# Patient Record
Sex: FEMALE | Race: WHITE | NOT HISPANIC OR LATINO | Employment: OTHER | URBAN - METROPOLITAN AREA
[De-identification: names, ages, dates, MRNs, and addresses within clinical notes are randomized per-mention and may not be internally consistent; named-entity substitution may affect disease eponyms.]

---

## 2017-03-17 ENCOUNTER — OFFICE VISIT (OUTPATIENT)
Dept: SURGERY | Facility: CLINIC | Age: 32
End: 2017-03-17

## 2017-03-17 VITALS — HEIGHT: 66 IN | HEART RATE: 82 BPM | BODY MASS INDEX: 39.05 KG/M2 | WEIGHT: 243 LBS | OXYGEN SATURATION: 98 %

## 2017-03-17 DIAGNOSIS — L72.11 PILAR CYST: Primary | ICD-10-CM

## 2017-03-17 PROCEDURE — 99203 OFFICE O/P NEW LOW 30 MIN: CPT | Performed by: SURGERY

## 2017-03-17 RX ORDER — NORETHINDRONE-ETHIN. ESTRADIOL 7 DAYS X 3
1 TABLET ORAL DAILY
COMMUNITY
Start: 2017-03-03

## 2017-03-22 NOTE — PROGRESS NOTES
General Surgery  Initial Office Visit    CC: Pilar cyst    HPI: The patient is a pleasant 32 y.o. 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 They have been present for over one year and have slowly enlarged.  She has 4 in particular that she can palpate and she wishes to have excised electively.  She has a previous history of a pilar cyst in 2013 that was excised without issue.  The 4 separate cysts are primarily located over the right parietal scalp, with the largest one just behind her right ear measuring 1.5 cm in diameter.  The others are all between 3 and 5 mm in diameter.  None of these have ever been erythematous, tender, or had any drainage except for the one just behind her right ear that gets rubbed frequently when she wears had bands and his becoming more and more uncomfortable.    Past Medical History: Asthma, arthritis, depression    Past Surgical History: Ovarian surgery in 2008, previous pilar cyst removal in 2013    Medications: Cyclafem 7/7/7 once daily    Allergies: No known drug allergies    Family History: Grandma has a history of lung cancer, mother and sister both have history of pilar cysts of the scalp    Social History: Single, nonsmoker, drinks 2 alcoholic beverages per week, works as a missionary for [employer]    ROS:   Constitutional: Negative for fevers or chills  HENT: Positive for congestion, mouth sores, runny nose, sinus pressure, sneezing, and sore throat; Negative for hearing loss  Eyes: Negative for vision changes or scleral icterus  Respiratory: Positive for wheezing; Negative for cough or shortness of breath  Cardiovascular: Negative for chest pain or heart palpitations  Gastrointestinal: Negative for abdominal pain, nausea, vomiting, constipation, melena, or hematochezia  Genitourinary: Negative for hematuria or dysuria  Musculoskeletal: Positive for joint pain; Negative for muscle pain or back pain  Skin: Positive for rash;  Negative for non-healing wounds  Neurologic: Negative for headaches or dizziness  Psychiatric: Negative for anxiety or depression  All other systems reviewed and negative    Physical Exam:  Vitals:    03/17/17 1041   Pulse: 82   SpO2: 98%     General: No acute distress, well-nourished & well-developed  HEAD: normocephalic, atraumatic, 1.5 cm right posterior auricular pilar cyst, 3 mm right parietal pilar cyst, 3 mm right parietal pilar cyst just anterior to previously mentioned cyst, 5 mm left parietal pilar cyst; none with erythema, fluctuance, or drainage  EYES: normal conjunctiva, sclera anicteric  EARS: grossly normal hearing  NECK: supple, no thyromegaly  CARDIOVASCULAR: regular rate and rhythm  RESPIRATORY: clear to auscultation bilaterally  GASTROINTESTINAL: soft, nontender, non-distended  MUSCULOSKELETAL: normal gait and station. No gross extremity abnormalities  PSYCHIATRIC: oriented x3, normal mood and affect    ASSESSMENT & PLAN  I discussed with her the option of having all of these cysts electively excised from her scalp under local/MAC.  I discussed that I would need to shave a small rim of hair from around each of the cysts and she immediately changed her mind about having this performed as she does not want to have any of her hair shaved off.  I counseled her that there is no way to remove these pilar cysts and close the incision properly with the hair being in the way.  Additionally, given the heavy vascularity of the scalp,I have no plans on doing this in the office setting given her risk for bleeding.  I would only agree to do this in the operating room with light sedation to achieve better hemostasis.  Again, she wishes to avoid having to have any hair removed and therefore she will call me in the future if she changes her mind about undergoing elective cyst excision.  I gave her my card, and told her that she can follow-up with me as needed.    Inessa Stone MD  General and Endoscopic  Surgery  [facility]    [address], [address]  [place], [place], [address]  P: [phone]  F: [phone]

## 2017-07-26 ENCOUNTER — OFFICE VISIT (OUTPATIENT)
Dept: SURGERY | Facility: CLINIC | Age: 32
End: 2017-07-26

## 2017-07-26 VITALS
BODY MASS INDEX: 38.41 KG/M2 | WEIGHT: 239 LBS | RESPIRATION RATE: 20 BRPM | OXYGEN SATURATION: 98 % | SYSTOLIC BLOOD PRESSURE: 130 MMHG | HEIGHT: 66 IN | HEART RATE: 85 BPM | DIASTOLIC BLOOD PRESSURE: 78 MMHG

## 2017-07-26 DIAGNOSIS — R10.31 RIGHT LOWER QUADRANT ABDOMINAL PAIN: Primary | ICD-10-CM

## 2017-07-26 PROCEDURE — 99244 OFF/OP CNSLTJ NEW/EST MOD 40: CPT | Performed by: SURGERY

## 2017-07-26 NOTE — PROGRESS NOTES
Chief Complaint   Patient presents with   • Abdominal Pain        Patient is a 32 y.o. female referred by Suresh Cisse MD for evaluation of abdominal pain.  Patient reports that this pain started in January on her right side low pelvic area and was worse during her menses.  Patient had had a left oophorectomy for a large cyst in the past and thought maybe it was gynecological pain.  Patient describes the pain as a gnawing stabbing intermittent sometimes sharp pain.  Patient thinks eating makes it feel better.  Pain is worse in the morning and with her menses.  Having a bowel movement does not alleviate the pain.  However she is seeing a lot of mucus in her stool now that wasn't there before.  Patient had a vaginal ultrasound and was evaluated by her OB/GYN and was told that this was not pain from her gynecological area.  Patient reports that her mother had colon polyps.  Patient denies ever having any ulcers.  Patient denies any family history of ulcerative colitis, Crohn's disease, familial polyposis or colon cancer.  Patient denies melena, hematochezia or bright red blood per abdomen.  Patient denies fever, chills, nausea or vomiting.    Past Medical History:   Diagnosis Date   • Arthritis    • Asthma    • Depression      Past Surgical History:   Procedure Laterality Date   • OVARY SURGERY      left side taken out     Family History   Problem Relation Age of Onset   • Lung cancer Maternal Grandmother    • Colon polyps Mother      Social History   Substance Use Topics   • Smoking status: Never Smoker   • Smokeless tobacco: Never Used   • Alcohol use 1.2 oz/week     2 Standard drinks or equivalent per week      Comment: WEEKLY         Current Outpatient Prescriptions:   •  CYCLAFEM 7/7/7 0.5/0.75/1-35 MG-MCG per tablet, Take 1 tablet by mouth Daily., Disp: , Rfl:   •  ibuprofen (ADVIL,MOTRIN) 100 MG/5ML suspension, Take  by mouth Every 6 (Six) Hours As Needed for Mild Pain (1-3)., Disp: , Rfl:   •  Probiotic  "Product (SOLUBLE FIBER/PROBIOTICS PO), Take  by mouth., Disp: , Rfl:     Review of Systems   Constitutional: Positive for unexpected weight change.   Respiratory: Positive for cough and shortness of breath.    Gastrointestinal: Positive for abdominal pain.        Bowel habits change      Musculoskeletal: Positive for joint swelling.   Skin:        Itching      All other systems reviewed and are negative.    Vitals:    07/26/17 1202   BP: 130/78   BP Location: Left arm   Patient Position: Sitting   Cuff Size: Adult   Pulse: 85   Resp: 20   SpO2: 98%   Weight: 239 lb (108 kg)   Height: 66\" (167.6 cm)       Physical Exam  General/physcological:   Alert and oriented x3 in no acute distress  HEENT: Normal cephalic, atraumatic, PERRLA, EOMI, sclera anicteric, moist mucous membranes, neck is supple, no JVD, no carotid bruits, no thyromegaly no adenopathy  Respiratory: CTA and percussion  CVA: RRR, normal S1-S2, no murmurs, no gallops or rubs  GI: Positive BS, soft, nondistended, nontender, no rebound, no guarding, no hernias, no organomegaly and no masses  Musculoskeletal: Full range of motion, no clubbing, no cyanosis or edema  Neurovascular: Grossly intact    Patient does not use tobacco products currently and I have counseled the patient to not start using tobacco products in the future.    Assessment:  Right sided abdominal pain  Plan:  I have recommended that the patient undergo further workup with an EGD and colonoscopy for her abdominal pain and change in her bowel movements.  I have discussed both of these procedures in detail with the patient.  I have discussed the risks, benefits and alternatives.  I have discussed the risk of anesthesia, bleeding and perforation.  Patient understands these risks, benefits and alternatives and wishes to proceed.  I have her scheduled at her earliest convenience.    Erika Owens MD  General, Minimally Invasive and Endoscopic Surgery  [facility]    [address], [address]  [place], [place], [address]  P: [phone]  F: [phone]    Cc:  Suresh Cisse MD

## 2017-07-27 NOTE — H&P
Chief Complaint   Patient presents with   • Abdominal Pain        Patient is a 32 y.o. female referred by Suresh Cisse MD for evaluation of abdominal pain.  Patient reports that this pain started in January on her right side low pelvic area and was worse during her menses.  Patient had had a left oophorectomy for a large cyst in the past and thought maybe it was gynecological pain.  Patient describes the pain as a gnawing stabbing intermittent sometimes sharp pain.  Patient thinks eating makes it feel better.  Pain is worse in the morning and with her menses.  Having a bowel movement does not alleviate the pain.  However she is seeing a lot of mucus in her stool now that wasn't there before.  Patient had a vaginal ultrasound and was evaluated by her OB/GYN and was told that this was not pain from her gynecological area.  Patient reports that her mother had colon polyps.  Patient denies ever having any ulcers.  Patient denies any family history of ulcerative colitis, Crohn's disease, familial polyposis or colon cancer.  Patient denies melena, hematochezia or bright red blood per abdomen.  Patient denies fever, chills, nausea or vomiting.    Past Medical History:   Diagnosis Date   • Arthritis    • Asthma    • Depression      Past Surgical History:   Procedure Laterality Date   • OVARY SURGERY      left side taken out     Family History   Problem Relation Age of Onset   • Lung cancer Maternal Grandmother    • Colon polyps Mother      Social History   Substance Use Topics   • Smoking status: Never Smoker   • Smokeless tobacco: Never Used   • Alcohol use 1.2 oz/week     2 Standard drinks or equivalent per week      Comment: WEEKLY         Current Outpatient Prescriptions:   •  CYCLAFEM 7/7/7 0.5/0.75/1-35 MG-MCG per tablet, Take 1 tablet by mouth Daily., Disp: , Rfl:   •  ibuprofen (ADVIL,MOTRIN) 100 MG/5ML suspension, Take  by mouth Every 6 (Six) Hours As Needed for Mild Pain (1-3)., Disp: , Rfl:   •  Probiotic  "Product (SOLUBLE FIBER/PROBIOTICS PO), Take  by mouth., Disp: , Rfl:     Review of Systems   Constitutional: Positive for unexpected weight change.   Respiratory: Positive for cough and shortness of breath.    Gastrointestinal: Positive for abdominal pain.        Bowel habits change      Musculoskeletal: Positive for joint swelling.   Skin:        Itching      All other systems reviewed and are negative.    Vitals:    07/26/17 1202   BP: 130/78   BP Location: Left arm   Patient Position: Sitting   Cuff Size: Adult   Pulse: 85   Resp: 20   SpO2: 98%   Weight: 239 lb (108 kg)   Height: 66\" (167.6 cm)       Physical Exam  General/physcological:   Alert and oriented x3 in no acute distress  HEENT: Normal cephalic, atraumatic, PERRLA, EOMI, sclera anicteric, moist mucous membranes, neck is supple, no JVD, no carotid bruits, no thyromegaly no adenopathy  Respiratory: CTA and percussion  CVA: RRR, normal S1-S2, no murmurs, no gallops or rubs  GI: Positive BS, soft, nondistended, nontender, no rebound, no guarding, no hernias, no organomegaly and no masses  Musculoskeletal: Full range of motion, no clubbing, no cyanosis or edema  Neurovascular: Grossly intact    Patient does not use tobacco products currently and I have counseled the patient to not start using tobacco products in the future.    Assessment:  Right sided abdominal pain  Plan:  I have recommended that the patient undergo further workup with an EGD and colonoscopy for her abdominal pain and change in her bowel movements.  I have discussed both of these procedures in detail with the patient.  I have discussed the risks, benefits and alternatives.  I have discussed the risk of anesthesia, bleeding and perforation.  Patient understands these risks, benefits and alternatives and wishes to proceed.  I have her scheduled at her earliest convenience.    Erika Owens MD  General, Minimally Invasive and Endoscopic Surgery  [facility]    [address], [address]  [place], [place], [address]  P: [phone]  F: [phone]    Cc:  Suresh Cisse MD

## 2017-08-07 ENCOUNTER — LAB REQUISITION (OUTPATIENT)
Dept: LAB | Facility: HOSPITAL | Age: 32
End: 2017-08-07

## 2017-08-07 ENCOUNTER — OUTSIDE FACILITY SERVICE (OUTPATIENT)
Dept: SURGERY | Facility: CLINIC | Age: 32
End: 2017-08-07

## 2017-08-07 DIAGNOSIS — R10.31 RIGHT LOWER QUADRANT PAIN: ICD-10-CM

## 2017-08-07 PROCEDURE — 43239 EGD BIOPSY SINGLE/MULTIPLE: CPT | Performed by: SURGERY

## 2017-08-07 PROCEDURE — 88312 SPECIAL STAINS GROUP 1: CPT | Performed by: SURGERY

## 2017-08-07 PROCEDURE — 45378 DIAGNOSTIC COLONOSCOPY: CPT | Performed by: SURGERY

## 2017-08-07 PROCEDURE — 88305 TISSUE EXAM BY PATHOLOGIST: CPT | Performed by: SURGERY

## 2017-08-09 LAB
CYTO UR: NORMAL
LAB AP CASE REPORT: NORMAL
LAB AP CLINICAL INFORMATION: NORMAL
Lab: NORMAL
PATH REPORT.FINAL DX SPEC: NORMAL
PATH REPORT.GROSS SPEC: NORMAL

## 2018-09-12 ENCOUNTER — OFFICE VISIT (OUTPATIENT)
Dept: SURGERY | Facility: CLINIC | Age: 33
End: 2018-09-12

## 2018-09-12 VITALS
SYSTOLIC BLOOD PRESSURE: 130 MMHG | WEIGHT: 231.5 LBS | DIASTOLIC BLOOD PRESSURE: 80 MMHG | HEART RATE: 77 BPM | OXYGEN SATURATION: 100 % | BODY MASS INDEX: 37.21 KG/M2 | HEIGHT: 66 IN

## 2018-09-12 DIAGNOSIS — R10.31 RIGHT LOWER QUADRANT ABDOMINAL PAIN: ICD-10-CM

## 2018-09-12 DIAGNOSIS — R19.7 DIARRHEA, UNSPECIFIED TYPE: Primary | ICD-10-CM

## 2018-09-12 PROCEDURE — 99244 OFF/OP CNSLTJ NEW/EST MOD 40: CPT | Performed by: SURGERY

## 2018-09-12 RX ORDER — ACETAMINOPHEN 325 MG/1
500 TABLET ORAL
COMMUNITY

## 2018-09-12 RX ORDER — CETIRIZINE HYDROCHLORIDE, PSEUDOEPHEDRINE HYDROCHLORIDE 5; 120 MG/1; MG/1
1 TABLET, FILM COATED, EXTENDED RELEASE ORAL
COMMUNITY

## 2018-09-12 NOTE — PROGRESS NOTES
Chief Complaint   Patient presents with   • Abdominal Pain     possible IBS        Patient is a 33 y.o. female referred by Suresh Cisse MD for evaluation of abdominal pain and diarrhea.  Patient reports is been going on for about a year and a half.  Patient reports almost every morning she has diarrhea and has been living on Imodium.  Patient reports she also has known ovarian cyst.  Patient is reported that her OB/GYN doctor thinks that she possibly has endometriosis that is causing these problems.  However, patient thinks it is irritable bowel syndrome.  Patient did undergo a colonoscopy and an EGD a year ago that was negative.  One food that seems to bother her symptoms more than others is dairy products and lasagna.  Patient also reports she has nausea and the pain sometimes is on the right and sometimes on the left.  Patient denies fever, chills, jaundice, patricia colored stools or dark urine.  Patient has not been worked up for gallbladder disease.  Patient reports she does have a family history of gallbladder disease.    Past Medical History:   Diagnosis Date   • Abdominal pain    • Arthritis    • Asthma    • Depression      Past Surgical History:   Procedure Laterality Date   • OVARY SURGERY      left side taken out     Family History   Problem Relation Age of Onset   • Lung cancer Maternal Grandmother    • Colon polyps Mother      Social History   Substance Use Topics   • Smoking status: Never Smoker   • Smokeless tobacco: Never Used   • Alcohol use 1.2 oz/week     2 Standard drinks or equivalent per week      Comment: WEEKLY         Current Outpatient Prescriptions:   •  CYCLAFEM 7/7/7 0.5/0.75/1-35 MG-MCG per tablet, Take 1 tablet by mouth Daily., Disp: , Rfl:   •  ibuprofen (ADVIL,MOTRIN) 100 MG/5ML suspension, Take  by mouth Every 6 (Six) Hours As Needed for Mild Pain (1-3)., Disp: , Rfl:   •  norethindrone-ethinyl estradiol 0.5/0.75/1-35 MG-MCG per tablet, Take 1 tablet by mouth., Disp: , Rfl:   •   "Probiotic Product (SOLUBLE FIBER/PROBIOTICS PO), Take  by mouth., Disp: , Rfl:   •  acetaminophen (TYLENOL) 325 MG tablet, Take 500 mg by mouth., Disp: , Rfl:   •  cetirizine-pseudoephedrine (ZyrTEC-D) 5-120 MG per 12 hr tablet, Take 1 tablet by mouth., Disp: , Rfl:     Review of Systems   General: Patient reports good health  Eyes: No eye problems  Ears, nose, mouth and throat: No rhinitis, no hearing problems, no chronic cough  Cardiovascular/heart: Denies palpitations, syncope or chest pain  Respiratory/lung: Denies shortness of breath, hemoptysis, or dyspnea on exertion   Genital/urinary: No frequency, hematuria or dysuria  Hematological/lymphatic: Denies anemia or other problems  Musculoskeletal: Arthritis  Skin: No psoriasis or other skin issues  Neurological: No seizures or other neurological problems  Psychiatric: Depression  Endocrine: Negative  Gastro-intestinal: See history of present illness  All other systems have been reviewed and are negative.  Vitals:    09/12/18 0959   BP: 130/80   Pulse: 77   SpO2: 100%   Weight: 105 kg (231 lb 8 oz)   Height: 167.6 cm (66\")       Physical Exam  General/physcological:   Alert and oriented x3 in no acute distress  HEENT: Normal cephalic, atraumatic, PERRLA, EOMI, sclera anicteric, moist mucous membranes, neck is supple, no JVD, no carotid bruits, no thyromegaly no adenopathy  Respiratory: CTA and percussion  CVA: RRR, normal S1-S2, no murmurs, no gallops or rubs  GI: Positive BS, soft, nondistended, nontender, no rebound, no guarding, no hernias, no organomegaly and no masses  Musculoskeletal: Full range of motion, no clubbing, no cyanosis or edema  Neurovascular: Grossly intact    Patient does not use tobacco products currently and I have counseled the patient to not start using tobacco products in the future.    Assessment:  Abdominal pain diffuse  Diarrhea  Nausea    Plan:  I have advised the patient that she may have irritable bowel syndrome but is a diagnoses " of exclusion.  Patient needs further evaluation with an ultrasound and HIDA scan of the gallbladder.  Also patient is scheduled with her OB/GYN to undergo a diagnostic laparoscopy.  Further recommendations will be based on the above results.    Erika Owens MD  General, Minimally Invasive and Endoscopic Surgery  [facility]    [address], [address]  [place], [place], [address]  P: [phone]  F: [phone]    Cc:  Suresh Cisse MD

## 2018-09-12 NOTE — PROGRESS NOTES
"Chief complaint:  Post-op  Follow up    History of Present Illness    This is Stephanie Latif 33 y.o. status post *** and is doing very well.  Patient denies fever, chills, nausea or vomiting.  Patient's pain is well-controlled.      The following portions of the patient's history were reviewed and updated as appropriate: allergies, current medications, past family history, past medical history, past social history, past surgical history and problem list.    Vitals:    09/12/18 0959   BP: 130/80   Pulse: 77   SpO2: 100%   Weight: 105 kg (231 lb 8 oz)   Height: 167.6 cm (66\")       Physical Exam  Incision is well-healed without evidence of {No wound infection:74057}.    Patient does not use tobacco products currently and I have counseled the patient not to start using tobacco products in the future.    Assessment/plan:    This is Stephanie Latif 33 y.o. status post *** and is doing very well.  I have instructed the patient not lift greater than 10 pounds for total of 6 weeks from the time of surgery. I have instructed the patient follow-up as needed.    Erika Owens MD  General, Minimally Invasive and Endoscopic Surgery  [facility]    [address], [address]  [place], [place], [address]  P: [phone]  F: [phone]    Cc:  Suresh Cisse MD  "

## 2018-09-21 ENCOUNTER — HOSPITAL ENCOUNTER (OUTPATIENT)
Dept: ULTRASOUND IMAGING | Facility: HOSPITAL | Age: 33
Discharge: HOME | End: 2018-09-21
Attending: SURGERY | Admitting: SURGERY

## 2018-09-21 ENCOUNTER — HOSPITAL ENCOUNTER (OUTPATIENT)
Dept: NUCLEAR MEDICINE | Facility: HOSPITAL | Age: 33
Discharge: HOME | End: 2018-09-21
Attending: SURGERY

## 2018-09-21 DIAGNOSIS — R10.31 RIGHT LOWER QUADRANT ABDOMINAL PAIN: ICD-10-CM

## 2018-09-21 DIAGNOSIS — R19.7 DIARRHEA, UNSPECIFIED TYPE: ICD-10-CM

## 2018-09-21 PROCEDURE — A9537 TC99M MEBROFENIN: HCPCS | Performed by: SURGERY

## 2018-09-21 PROCEDURE — 0 TECHNETIUM TC 99M MEBROFENIN KIT: Performed by: SURGERY

## 2018-09-21 PROCEDURE — 76705 ECHO EXAM OF ABDOMEN: CPT

## 2018-09-21 PROCEDURE — 78226 HEPATOBILIARY SYSTEM IMAGING: CPT

## 2018-09-21 RX ORDER — KIT FOR THE PREPARATION OF TECHNETIUM TC 99M MEBROFENIN 45 MG/10ML
1 INJECTION, POWDER, LYOPHILIZED, FOR SOLUTION INTRAVENOUS
Status: COMPLETED | OUTPATIENT
Start: 2018-09-21 | End: 2018-09-21

## 2018-09-21 RX ADMIN — MEBROFENIN 1 DOSE: 45 INJECTION, POWDER, LYOPHILIZED, FOR SOLUTION INTRAVENOUS at 08:53

## 2018-09-27 RX ORDER — DICYCLOMINE HYDROCHLORIDE 10 MG/1
10 CAPSULE ORAL 4 TIMES DAILY
Qty: 120 CAPSULE | Refills: 12 | Status: SHIPPED | OUTPATIENT
Start: 2018-09-27 | End: 2019-09-27